# Patient Record
Sex: MALE | Race: WHITE | NOT HISPANIC OR LATINO | URBAN - METROPOLITAN AREA
[De-identification: names, ages, dates, MRNs, and addresses within clinical notes are randomized per-mention and may not be internally consistent; named-entity substitution may affect disease eponyms.]

---

## 2017-08-30 ENCOUNTER — EMERGENCY (EMERGENCY)
Facility: HOSPITAL | Age: 72
LOS: 1 days | Discharge: HOME | End: 2017-08-30
Attending: EMERGENCY MEDICINE
Payer: MEDICARE

## 2017-08-30 VITALS
HEART RATE: 76 BPM | RESPIRATION RATE: 17 BRPM | HEIGHT: 69 IN | DIASTOLIC BLOOD PRESSURE: 84 MMHG | OXYGEN SATURATION: 99 % | TEMPERATURE: 98 F | SYSTOLIC BLOOD PRESSURE: 175 MMHG | WEIGHT: 160.06 LBS

## 2017-08-30 VITALS
HEART RATE: 90 BPM | RESPIRATION RATE: 18 BRPM | OXYGEN SATURATION: 98 % | DIASTOLIC BLOOD PRESSURE: 91 MMHG | SYSTOLIC BLOOD PRESSURE: 169 MMHG | TEMPERATURE: 98 F

## 2017-08-30 LAB
ANION GAP SERPL CALC-SCNC: 20 MMOL/L — HIGH (ref 5–17)
APPEARANCE UR: CLEAR — SIGNIFICANT CHANGE UP
APTT BLD: 27.5 SEC — SIGNIFICANT CHANGE UP (ref 27.5–37.4)
BILIRUB UR-MCNC: NEGATIVE — SIGNIFICANT CHANGE UP
BUN SERPL-MCNC: 15 MG/DL — SIGNIFICANT CHANGE UP (ref 8–20)
CALCIUM SERPL-MCNC: 9.9 MG/DL — SIGNIFICANT CHANGE UP (ref 8.6–10.2)
CHLORIDE SERPL-SCNC: 91 MMOL/L — LOW (ref 98–107)
CO2 SERPL-SCNC: 22 MMOL/L — SIGNIFICANT CHANGE UP (ref 22–29)
COLOR SPEC: SIGNIFICANT CHANGE UP
CREAT SERPL-MCNC: 1.02 MG/DL — SIGNIFICANT CHANGE UP (ref 0.5–1.3)
DIFF PNL FLD: ABNORMAL
EPI CELLS # UR: SIGNIFICANT CHANGE UP
GLUCOSE SERPL-MCNC: 161 MG/DL — HIGH (ref 70–115)
GLUCOSE UR QL: NEGATIVE MG/DL — SIGNIFICANT CHANGE UP
HCT VFR BLD CALC: 32.7 % — LOW (ref 42–52)
HGB BLD-MCNC: 11.1 G/DL — LOW (ref 14–18)
INR BLD: 1.02 RATIO — SIGNIFICANT CHANGE UP (ref 0.88–1.16)
KETONES UR-MCNC: ABNORMAL
LEUKOCYTE ESTERASE UR-ACNC: NEGATIVE — SIGNIFICANT CHANGE UP
MCHC RBC-ENTMCNC: 28.3 PG — SIGNIFICANT CHANGE UP (ref 27–31)
MCHC RBC-ENTMCNC: 33.9 G/DL — SIGNIFICANT CHANGE UP (ref 32–36)
MCV RBC AUTO: 83.4 FL — SIGNIFICANT CHANGE UP (ref 80–94)
NITRITE UR-MCNC: NEGATIVE — SIGNIFICANT CHANGE UP
PH UR: 7 — SIGNIFICANT CHANGE UP (ref 5–8)
PLATELET # BLD AUTO: 498 K/UL — HIGH (ref 150–400)
POTASSIUM SERPL-MCNC: 3.9 MMOL/L — SIGNIFICANT CHANGE UP (ref 3.5–5.3)
POTASSIUM SERPL-SCNC: 3.9 MMOL/L — SIGNIFICANT CHANGE UP (ref 3.5–5.3)
PROT UR-MCNC: 30 MG/DL
PROTHROM AB SERPL-ACNC: 11.2 SEC — SIGNIFICANT CHANGE UP (ref 9.8–12.7)
RBC # BLD: 3.92 M/UL — LOW (ref 4.6–6.2)
RBC # FLD: 16 % — HIGH (ref 11–15.6)
RBC CASTS # UR COMP ASSIST: SIGNIFICANT CHANGE UP /HPF (ref 0–4)
SODIUM SERPL-SCNC: 133 MMOL/L — LOW (ref 135–145)
SP GR SPEC: 1.01 — SIGNIFICANT CHANGE UP (ref 1.01–1.02)
UROBILINOGEN FLD QL: NEGATIVE MG/DL — SIGNIFICANT CHANGE UP
WBC # BLD: 18.9 K/UL — HIGH (ref 4.8–10.8)
WBC # FLD AUTO: 18.9 K/UL — HIGH (ref 4.8–10.8)
WBC UR QL: SIGNIFICANT CHANGE UP

## 2017-08-30 PROCEDURE — 80048 BASIC METABOLIC PNL TOTAL CA: CPT

## 2017-08-30 PROCEDURE — 71045 X-RAY EXAM CHEST 1 VIEW: CPT

## 2017-08-30 PROCEDURE — 73030 X-RAY EXAM OF SHOULDER: CPT

## 2017-08-30 PROCEDURE — 81001 URINALYSIS AUTO W/SCOPE: CPT

## 2017-08-30 PROCEDURE — 96374 THER/PROPH/DIAG INJ IV PUSH: CPT

## 2017-08-30 PROCEDURE — 73060 X-RAY EXAM OF HUMERUS: CPT

## 2017-08-30 PROCEDURE — 73030 X-RAY EXAM OF SHOULDER: CPT | Mod: 26,LT

## 2017-08-30 PROCEDURE — 85610 PROTHROMBIN TIME: CPT

## 2017-08-30 PROCEDURE — 99284 EMERGENCY DEPT VISIT MOD MDM: CPT | Mod: 25

## 2017-08-30 PROCEDURE — 96376 TX/PRO/DX INJ SAME DRUG ADON: CPT

## 2017-08-30 PROCEDURE — 96375 TX/PRO/DX INJ NEW DRUG ADDON: CPT

## 2017-08-30 PROCEDURE — 71010: CPT | Mod: 26

## 2017-08-30 PROCEDURE — 73060 X-RAY EXAM OF HUMERUS: CPT | Mod: 26,76,LT

## 2017-08-30 PROCEDURE — 99285 EMERGENCY DEPT VISIT HI MDM: CPT

## 2017-08-30 PROCEDURE — 85730 THROMBOPLASTIN TIME PARTIAL: CPT

## 2017-08-30 PROCEDURE — 85027 COMPLETE CBC AUTOMATED: CPT

## 2017-08-30 PROCEDURE — 36415 COLL VENOUS BLD VENIPUNCTURE: CPT

## 2017-08-30 RX ORDER — VENLAFAXINE HCL 75 MG
0 CAPSULE, EXT RELEASE 24 HR ORAL
Qty: 0 | Refills: 0 | COMMUNITY

## 2017-08-30 RX ORDER — ONDANSETRON 8 MG/1
4 TABLET, FILM COATED ORAL ONCE
Qty: 0 | Refills: 0 | Status: COMPLETED | OUTPATIENT
Start: 2017-08-30 | End: 2017-08-30

## 2017-08-30 RX ORDER — MORPHINE SULFATE 50 MG/1
4 CAPSULE, EXTENDED RELEASE ORAL ONCE
Qty: 0 | Refills: 0 | Status: DISCONTINUED | OUTPATIENT
Start: 2017-08-30 | End: 2017-08-30

## 2017-08-30 RX ORDER — TRAMADOL HYDROCHLORIDE 50 MG/1
0 TABLET ORAL
Qty: 0 | Refills: 0 | COMMUNITY

## 2017-08-30 RX ORDER — OXYCODONE HYDROCHLORIDE 5 MG/1
1 TABLET ORAL
Qty: 28 | Refills: 0 | OUTPATIENT
Start: 2017-08-30 | End: 2017-09-06

## 2017-08-30 RX ORDER — HYDRALAZINE HCL 50 MG
10 TABLET ORAL ONCE
Qty: 0 | Refills: 0 | Status: COMPLETED | OUTPATIENT
Start: 2017-08-30 | End: 2017-08-30

## 2017-08-30 RX ORDER — MORPHINE SULFATE 50 MG/1
2 CAPSULE, EXTENDED RELEASE ORAL ONCE
Qty: 0 | Refills: 0 | Status: DISCONTINUED | OUTPATIENT
Start: 2017-08-30 | End: 2017-08-30

## 2017-08-30 RX ADMIN — Medication 10 MILLIGRAM(S): at 18:34

## 2017-08-30 RX ADMIN — MORPHINE SULFATE 2 MILLIGRAM(S): 50 CAPSULE, EXTENDED RELEASE ORAL at 14:03

## 2017-08-30 RX ADMIN — MORPHINE SULFATE 4 MILLIGRAM(S): 50 CAPSULE, EXTENDED RELEASE ORAL at 17:24

## 2017-08-30 RX ADMIN — MORPHINE SULFATE 2 MILLIGRAM(S): 50 CAPSULE, EXTENDED RELEASE ORAL at 14:34

## 2017-08-30 RX ADMIN — ONDANSETRON 4 MILLIGRAM(S): 8 TABLET, FILM COATED ORAL at 14:03

## 2017-08-30 NOTE — ED ADULT NURSE REASSESSMENT NOTE - NS ED NURSE REASSESS COMMENT FT1
Patient A&OX3, c/o L shoulder pain this time. Pain meds given as per MD ordered. VSS. safety Maintained.

## 2017-08-30 NOTE — CONSULT NOTE ADULT - SUBJECTIVE AND OBJECTIVE BOX
Pt Name: CANDACE LANDIN    MRN: [id number]    Patient is a 71y Male presenting to the emergency department with a chief complaint of left shoulder pain after fall in shower this morning  Patient states he slipped onto his left shoulder while taking a shower   Denies any loss of sensation or motor function to distal left upper extremity    HEALTH ISSUES - PROBLEM Dx:    REVIEW OF SYSTEMS    Musculoskeletal: + pain and swelling left deltoid region	    ROS is otherwise negative.    PAST MEDICAL & SURGICAL HISTORY:  Degenerative disc disease, thoracic  Cancer  No significant past surgical history    Allergies: Allergy Status Unknown    Medications: morphine  - Injectable 4 milliGRAM(s) IV Push Once    FAMILY HISTORY:  No pertinent family history in first degree relatives  : non-contributory    Social History: former smoker, denies alcohol or illicit drug use    Ambulation: Walking independently with Walker                          11.1   18.9  )-----------( 498      ( 30 Aug 2017 15:42 )             32.7     08-30    133<L>  |  91<L>  |  15.0  ----------------------------<  161<H>  3.9   |  22.0  |  1.02    Ca    9.9      30 Aug 2017 15:42    PHYSICAL EXAM:    Vital Signs Last 24 Hrs  T(C): 36.4 (30 Aug 2017 11:32), Max: 36.7 (30 Aug 2017 11:15)  T(F): 97.6 (30 Aug 2017 11:32), Max: 98 (30 Aug 2017 11:15)  HR: 76 (30 Aug 2017 11:32) (76 - 76)  BP: 194/96 (30 Aug 2017 11:32) (175/84 - 194/96)  BP(mean): --  RR: 18 (30 Aug 2017 11:32) (17 - 18)  SpO2: 98% (30 Aug 2017 11:32) (98% - 99%)  Daily Height in cm: 175.26 (30 Aug 2017 11:15)    Daily     Appearance: Alert, responsive, in no acute distress.    Neurological: Sensation is grossly intact to light touch. 5/5 motor function distally of all extremities. No focal deficits or weaknesses found.    Skin: no rash on visible skin. Skin is clean, dry and intact. No bleeding. No abrasions. No ulcerations.    Vascular: 2+ distal pulses. Cap refill < 2 sec. No signs of venous insufficiency or stasis. No extremity ulcerations. No cyanosis.    Musculoskeletal:         Left Upper Extremity: skin intact, Small are of ecchymosis over deltoid region, + TTP over deltoid region, limited AROM shoulder secondary to pain, + active wrist flexion/extension, digital ROM intact, radial pulse intact, sensation to light touch intact    Coaptation applied- post splint application shows fracture in adequate alignment    Imaging Studies:    Xrays:   Severely comminuted the fracture of the left humeral neck with varus   deformity. AC joint osseous glenoid, clavicle and adjacent ribs intact.   Humeral head lies within glenoid fossa.   IMPRESSION:   Severely comminuted humeral neck fracture as described.         A/P:  Pt is a 71y Male found to have left proximal humerus fracture as described above    PLAN:   * maintain coaptation splint/sling  * NWB LUE  * pain control  * no acute orthopedic surgical intervention  * follow up as outpatient

## 2017-08-30 NOTE — ED ADULT NURSE NOTE - OBJECTIVE STATEMENT
Pt A&OX3, BIBA s/p fall while at home. Pt states he slipped on water in the shower, Denies LOC, blood thinners and hitting head. Patient c/o arm pain. VSS. safety maintained.

## 2017-08-30 NOTE — ED PROVIDER NOTE - PROGRESS NOTE DETAILS
STILL HAD PAIN AFTER MORPHINE. SECODN INJECTION ORDERED PT LIVES IN HIS OWN PLACE. NIECE THERE AFTER WORK BUT NOT ALL DAY. SW CONSULT.  SPOKE WITH DAUGHTER MICHELLE [phone] AWAITING . HIGH WBC LIKELY RELATED TO STRESS OF EVENT OR UNDERLYING ILLNESS(CANCER PT) STILL HAD PAIN AFTER MORPHINE. SECOND INJECTION ORDERED

## 2017-08-30 NOTE — CHART NOTE - NSCHARTNOTEFT_GEN_A_CORE
PIPPA Note - pt lives independently a neice and granddaughter stay at the house and assist as needed, both work. pt is determined to return home and refuses help. pt does not walk with walker and is able to manage - may need some assistance with bathing etc. but daughter Sumaya states she can help too. pt requested dtr come to transport him - SW spoke with Oc and she will arrive to [facility] at 7ish and stop to get some clothes and shoes for pt who BIBA with no clothes (fell In shower)

## 2017-08-30 NOTE — ED PROVIDER NOTE - CARE PLAN
Principal Discharge DX:	Fall, initial encounter Principal Discharge DX:	Fall, initial encounter  Secondary Diagnosis:	Humerus fracture

## 2017-08-30 NOTE — ED PROVIDER NOTE - MEDICAL DECISION MAKING DETAILS
S/P FALL. PAIN LEFT SHOULDER AND ARM. NO OTHER COMPLAINTS AND NO HEAD TRAUMA. MEDICATING FOR PAIN AND XRAYS ORDERED. WILL FOLLOW S/P FALL. PAIN LEFT SHOULDER AND ARM. NO OTHER COMPLAINTS AND NO HEAD TRAUMA. MEDICATING FOR PAIN AND XRAYS ORDERED. WILL FOLLOW  OTHER THAN ELEVATED BP  WHICH HAS BEEN TREATED AND HUMERUS FRACTURE, NO OTHER INJURIES. AWAITING FAMILY .

## 2017-08-30 NOTE — ED ADULT TRIAGE NOTE - CHIEF COMPLAINT QUOTE
Pt axox3 BIBA s/p fall while at home. Pt states he slipped on water in the shower, denies loc, denies use of blood thinners or hitting head.  Pt c/o 10/10 left arm pain which has sling present from EMS.  Pt ambulatory at home and in ER. Skin is intact, no deformities noted

## 2017-08-30 NOTE — ED PROVIDER NOTE - OBJECTIVE STATEMENT
PATIENT STATES HE FELL OUT OF THE SHOWER AND LANDED ONHIS LEFT SHOULDER. HAS DEGENERATIVE JOINT DISEASE AND HAS CHRONIC PAIN BUT THIS PAIN IS DIFFERENT. DID NOT HIT HIS HEAD. DID NOT LOSE CONSCIOUSNESS. NO VOMITING NO NEW LEG PAIN NO NEW BACK PAIN  HX OF ABDOMINAL CANCER.